# Patient Record
Sex: MALE | Race: WHITE | NOT HISPANIC OR LATINO | Employment: STUDENT | URBAN - METROPOLITAN AREA
[De-identification: names, ages, dates, MRNs, and addresses within clinical notes are randomized per-mention and may not be internally consistent; named-entity substitution may affect disease eponyms.]

---

## 2021-06-09 ENCOUNTER — HOSPITAL ENCOUNTER (EMERGENCY)
Facility: CLINIC | Age: 21
Discharge: HOME | End: 2021-06-09
Attending: EMERGENCY MEDICINE | Admitting: EMERGENCY MEDICINE
Payer: COMMERCIAL

## 2021-06-09 ENCOUNTER — APPOINTMENT (OUTPATIENT)
Dept: GENERAL RADIOLOGY | Facility: CLINIC | Age: 21
End: 2021-06-09
Attending: EMERGENCY MEDICINE
Payer: COMMERCIAL

## 2021-06-09 VITALS
HEIGHT: 75 IN | BODY MASS INDEX: 21.88 KG/M2 | HEART RATE: 91 BPM | SYSTOLIC BLOOD PRESSURE: 144 MMHG | TEMPERATURE: 98.7 F | OXYGEN SATURATION: 96 % | DIASTOLIC BLOOD PRESSURE: 79 MMHG | RESPIRATION RATE: 14 BRPM | WEIGHT: 176 LBS

## 2021-06-09 DIAGNOSIS — S61.212A LACERATION OF RIGHT MIDDLE FINGER WITHOUT FOREIGN BODY WITHOUT DAMAGE TO NAIL, INITIAL ENCOUNTER: ICD-10-CM

## 2021-06-09 PROCEDURE — 12001 RPR S/N/AX/GEN/TRNK 2.5CM/<: CPT | Performed by: EMERGENCY MEDICINE

## 2021-06-09 PROCEDURE — 99283 EMERGENCY DEPT VISIT LOW MDM: CPT | Performed by: EMERGENCY MEDICINE

## 2021-06-09 PROCEDURE — 99283 EMERGENCY DEPT VISIT LOW MDM: CPT | Mod: 25 | Performed by: EMERGENCY MEDICINE

## 2021-06-09 PROCEDURE — 73140 X-RAY EXAM OF FINGER(S): CPT | Mod: RT

## 2021-06-09 ASSESSMENT — MIFFLIN-ST. JEOR: SCORE: 1888.96

## 2021-06-09 NOTE — ED TRIAGE NOTES
Pt was installing a window a/c unit and the glass pane fell onto pt's hand lacerating the middle finger on the right hand

## 2021-06-09 NOTE — DISCHARGE INSTRUCTIONS
TODAY'S VISIT:  You were seen today for laceration   -   - If you had any labs or imaging/radiology tests performed today, you should also discuss these tests with your usual provider.     FOLLOW-UP:  Please make an appointment to follow up with:  - Your Primary Care Provider. If you do not have a PCP, please call the [facility] (phone: [phone] for an appointment    - Have your provider review the results from today's visit with you again to make sure no further follow-up or additional testing is needed based on those results.     PRESCRIPTIONS / MEDICATIONS:  - Tylenol or ibuprofen as needed for pain control     OTHER INSTRUCTIONS:  -The glue that was applied today will come off on its own in about 1 week.  Follow-up with your primary doctor as needed.  Keep the affected area as clean and dry as it is possible.  Change the dressing daily or whenever it becomes soiled.  Make sure to keep the wound covered when participating in any activities in which the affected area may become dirty.  Avoid swimming or submerging the affected area in the tub/bath for at least 1 week.  Showering is okay. Return to the emergency department immediately if you develop any signs of infection, including fevers/chills or increasing pain/redness/swelling/warmth/cloudy discharge from around the wound or if bleeding from the wound occurs and cannot be controlled with direct pressure to the wound. You may apply bacitracin to the laceration twice daily. Make sure to use sunscreen in the future whenever sun exposure to the affected area may occur in order to help minimize scarring.    RETURN TO THE EMERGENCY DEPARTMENT  Return to the Emergency Department at any time for any new or worsening symptoms or any concerns.

## 2021-06-09 NOTE — ED PROVIDER NOTES
History     Chief Complaint   Patient presents with     Laceration     Lac to middle finger on right hand     HPI  Mustapha Phan is a 20 year old otherwise healthy male who presents to the emergency department with a chief complaint of laceration.  Located on the middle finger of the right hand.  Occurred just prior to arrival when the patient was installing a window AC unit and the glass pane fell onto the patient's hand.  Per chart review, the patient's Tdap is up-to-date, with most recent vaccine having been given 2/23/2021.  No other associated injuries.  The patient is not on blood thinners.    I have reviewed the Medications, Allergies, Past Medical and Surgical History, and Social History in the Epic system.    History reviewed. No pertinent past medical history.  History reviewed. No pertinent surgical history.  No current facility-administered medications for this encounter.      No current outpatient medications on file.     No Known Allergies  Past medical history, past surgical history, medications, and allergies were reviewed with the patient. Additional pertinent items: None    Social History     Socioeconomic History     Marital status: Single     Spouse name: Not on file     Number of children: Not on file     Years of education: Not on file     Highest education level: Not on file   Occupational History     Not on file   Social Needs     Financial resource strain: Not on file     Food insecurity     Worry: Not on file     Inability: Not on file     Transportation needs     Medical: Not on file     Non-medical: Not on file   Tobacco Use     Smoking status: Never Smoker     Smokeless tobacco: Never Used   Substance and Sexual Activity     Alcohol use: Yes     Comment: 2-3 per week     Drug use: Never     Sexual activity: Not on file   Lifestyle     Physical activity     Days per week: Not on file     Minutes per session: Not on file     Stress: Not on file   Relationships     Social connections  "    Talks on phone: Not on file     Gets together: Not on file     Attends [employer] service: Not on file     Active member of club or organization: Not on file     Attends meetings of clubs or organizations: Not on file     Relationship status: Not on file     Intimate partner violence     Fear of current or ex partner: Not on file     Emotionally abused: Not on file     Physically abused: Not on file     Forced sexual activity: Not on file   Other Topics Concern     Not on file   Social History Narrative     Not on file     Social history was reviewed with the patient. Additional pertinent items: None    Review of Systems  General: No fevers or chills  Skin: No rash or diaphoresis, positive for laceration  Eyes: No eye redness or discharge  Ears/Nose/Throat: No rhinorrhea or nasal congestion  Respiratory: No cough or SOB  Cardiovascular: No chest pain or palpitations  Gastrointestinal: No nausea, vomiting, or diarrhea  Genitourinary: No urinary frequency, hematuria, or dysuria  Musculoskeletal: Positive for finger pain, otherwise no new arthralgias or myalgias  Neurologic: No numbness or weakness  Hematologic/Lymphatic/Immunologic: No leg swelling, no easy bruising/bleeding  Endocrine: No polyuria/polydypsia    A complete review of systems was performed with pertinent positives and negatives noted in the HPI, and all other systems negative.    Physical Exam   BP: (!) 144/79  Pulse: 91  Temp: 98.7  F (37.1  C)  Resp: 14  Height: 190.5 cm (6' 3\")  Weight: 79.8 kg (176 lb)  SpO2: 96 %      General: Well nourished, well developed, NAD  HEENT: EOMI, anicteric. NCAT, MMM  Neck: no jugular venous distension, supple, nl ROM  Cardiac: Regular rate and rhythm. No murmurs, rubs, or gallops. Normal S1, S2.  Intact peripheral pulses  Pulm: CTAB, no stridor, wheezes, rales, rhonchi  Abd: Soft, nontender, nondistended.  No masses palpated.    Skin: Warm and dry to the touch.  No rash, see extremity exam below  Extremities: No LE " edema, no cyanosis, w/w/p, linear laceration to right third digit approximately 1 centimeters long, distally neurovascularly intact, underlying tendon function is normal  neuro: A&Ox3, no gross focal deficits    ED Course        [facility]    -Laceration Repair    Date/Time: 6/9/2021 1:58 PM  Performed by: Tammy Hernadez MD  Authorized by: Tammy Hernadez MD       ANESTHESIA (see MAR for exact dosages):     Anesthesia method:  Local infiltration    Local anesthetic:  Lidocaine 1% w/o epi  LACERATION DETAILS     Location:  Finger    Finger location:  R long finger    Length (cm):  1    Laceration depth: <1.    REPAIR TYPE:     Repair type:  Simple      EXPLORATION:     Wound exploration: wound explored through full range of motion and entire depth of wound probed and visualized      Wound extent: no foreign body, no nerve damage, no tendon damage, no underlying fracture and no vascular damage      TREATMENT:     Area cleansed with:  Saline    Amount of cleaning:  Standard    Irrigation solution:  Sterile saline    Irrigation volume:  500 cc    Irrigation method:  Pressure wash    SKIN REPAIR     Repair method:  Tissue adhesive    APPROXIMATION     Approximation:  Close    POST-PROCEDURE DETAILS     Dressing:  Adhesive bandage      PROCEDURE   Patient Tolerance:  Patient tolerated the procedure well with no immediate complications  Describe Procedure: Patient was offered splint for protection of injured area, which he declined                             Labs Ordered and Resulted from Time of ED Arrival Up to the Time of Departure from the ED - No data to display         Results for orders placed or performed during the hospital encounter of 06/09/21 (from the past 24 hour(s))   XR Finger Right G/E 2 Views    Narrative    XR FINGER RT G/E 2 VW   6/9/2021 2:33 PM     HISTORY:  laceration      Impression    IMPRESSION: Unremarkable exam.    VANDANA AWAD MD        Labs, vital signs, and imaging studies were reviewed by me.    Medications - No data to display    Assessments & Plan (with Medical Decision Making)   Mustapha Phan is a 20 year old male who presents with laceration.  X-ray ordered to rule out foreign body or underlying fracture.  Tetanus vaccine is up-to-date per chart review.  Laceration repaired as described above.    X-ray shows no acute fracture or foreign body.    I have reviewed the nursing notes.    I have reviewed the findings, diagnosis, plan and need for follow up with the patient.    Patient to be discharged home. Advised to follow up with PCP as needed. To return to ER immediately with any new/worsening symptoms. Plan of care discussed with patient who expresses understanding and agrees with plan of care.      New Prescriptions    No medications on file       Final diagnoses:   Laceration of right middle finger without foreign body without damage to nail, initial encounter     Tammy Hernadez MD  6/9/2021   [facility] EMERGENCY DEPARTMENT     Tammy Hernadez MD  06/09/21 0046